# Patient Record
Sex: FEMALE | Race: BLACK OR AFRICAN AMERICAN
[De-identification: names, ages, dates, MRNs, and addresses within clinical notes are randomized per-mention and may not be internally consistent; named-entity substitution may affect disease eponyms.]

---

## 2023-03-01 ENCOUNTER — NON-APPOINTMENT (OUTPATIENT)
Age: 53
End: 2023-03-01

## 2023-03-01 ENCOUNTER — APPOINTMENT (OUTPATIENT)
Dept: ORTHOPEDIC SURGERY | Facility: CLINIC | Age: 53
End: 2023-03-01
Payer: OTHER MISCELLANEOUS

## 2023-03-01 VITALS — BODY MASS INDEX: 32.02 KG/M2 | HEIGHT: 62 IN | WEIGHT: 174 LBS

## 2023-03-01 PROBLEM — Z00.00 ENCOUNTER FOR PREVENTIVE HEALTH EXAMINATION: Status: ACTIVE | Noted: 2023-03-01

## 2023-03-01 PROCEDURE — 73110 X-RAY EXAM OF WRIST: CPT | Mod: RT

## 2023-03-01 PROCEDURE — 99072 ADDL SUPL MATRL&STAF TM PHE: CPT

## 2023-03-01 PROCEDURE — 99204 OFFICE O/P NEW MOD 45 MIN: CPT | Mod: ACP

## 2023-03-01 PROCEDURE — L3908: CPT

## 2023-03-01 RX ORDER — MELOXICAM 15 MG/1
15 TABLET ORAL
Qty: 30 | Refills: 2 | Status: ACTIVE | COMMUNITY
Start: 2023-03-01 | End: 1900-01-01

## 2023-03-01 NOTE — PHYSICAL EXAM
[Right] : right hand [Dorsal Wrist] : dorsal wrist [Distal Radius] : distal radius [First Dorsal Compartment] : first dorsal compartment [4___] : grasp 4[unfilled]/5 [] : negative tinel [TWNoteComboBox7] : dorsiflexion 40 degrees [TWNoteComboBox4] : volarflexion 30 degrees

## 2023-03-01 NOTE — HISTORY OF PRESENT ILLNESS
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 She states she was changing a pt who rolled back on to her causing a flexion injury to the right wrist. she notes limited range of motion and radially based pain since. she has had difficulties with adls and performing her work responsibilities\par \par pmh htn [FreeTextEntry5] : pt c.o pain in rt wrist \par pt injured rt wrist on monday states she was washing someone and they fell on her hand

## 2023-03-01 NOTE — ASSESSMENT
[FreeTextEntry1] : will start her on mobic, get her in cock up wrist splint, and into therapy\par she will be out of work and f/u in 2 weeks with Dr Gentile hand specialist

## 2023-03-01 NOTE — WORK
[Sprain/Strain] : sprain/strain [Was the competent medical cause of the injury] : was the competent medical cause of the injury [Are consistent with the injury] : are consistent with the injury [Consistent with my objective findings] : consistent with my objective findings [Total] : total [Does not reveal pre-existing condition(s) that may affect treatment/prognosis] : does not reveal pre-existing condition(s) that may affect treatment/prognosis [Cannot return to work because ________] : cannot return to work because [unfilled] [Climbing stairs/Ladders] : climbing stairs/ladders [Lifting] : lifting [Pulling/Pushing] : pulling/pushing [Simple grasping] : simple grasping [Reaching overhead] : reaching overhead [Reaching at/below shoulder level] : reaching at or below shoulder level [Use of upper extremities] : use of upper extremities [15+ days] : 15+ days [Patient] : patient [No] : No [No Rx restrictions] : No Rx restrictions. [I provided the services listed above] :  I provided the services listed above. [Less than Sedentary Work:] :  Less than Sedentary Work: Unable to meet the requirement of Sedentary Work. [FreeTextEntry1] : good

## 2023-03-06 ENCOUNTER — NON-APPOINTMENT (OUTPATIENT)
Age: 53
End: 2023-03-06

## 2023-03-14 ENCOUNTER — APPOINTMENT (OUTPATIENT)
Dept: ORTHOPEDIC SURGERY | Facility: CLINIC | Age: 53
End: 2023-03-14
Payer: OTHER MISCELLANEOUS

## 2023-03-14 DIAGNOSIS — I10 ESSENTIAL (PRIMARY) HYPERTENSION: ICD-10-CM

## 2023-03-14 PROCEDURE — 20550 NJX 1 TENDON SHEATH/LIGAMENT: CPT | Mod: RT

## 2023-03-14 PROCEDURE — 99214 OFFICE O/P EST MOD 30 MIN: CPT | Mod: 25

## 2023-03-14 RX ORDER — SPIRONOLACTONE 50 MG/1
TABLET ORAL
Refills: 0 | Status: ACTIVE | COMMUNITY

## 2023-03-14 NOTE — HISTORY OF PRESENT ILLNESS
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 She was putting a diaper on a patient with the patient laying on her side. The patient rolled back onto her hand, extending her wrist. \par Saw POPPY Melendez on 3/1/23 => XR, provided wrist brace, Rx Mobic.\par Patient reports that pain is improving.\par \par Denies prior injury/issue to RIGHT hand/wrist.\par She was initially OOW, returned to work - light duty - on 3/7/23.\par \par Hx: HTN. [] : no [FreeTextEntry1] : right wrist  [FreeTextEntry3] : 02/20/23 [FreeTextEntry5] : CEDRIC MCKAY was seen in [facility] on 03/01/23, she was fitted for a wrist brace and is on light duty. she states her pain decreased since that visit. pain with gripping, pushing and pulling. [de-identified] : gripping, pushing, pulling  [de-identified] : 03/01/23 [de-identified] : POPPY Melendez [de-identified] : xrays  [de-identified] : CNA

## 2023-03-14 NOTE — IMAGING
[de-identified] : RIGHT HAND\par thenar atrophy.\par skin intact. no swelling.\par TTP to 1st extensor compartment > thumb CMCJ. mild TTP to RF A1 pulley.\par good wrist extension, flexion. good pronation, supination. \par good EPL, FPL. good finger extension, flex to full fist. good finger abduction and adduction. \par SILT to median, ulnar, radial distribution. \par palpable radial pulse, brisk cap refill all digits.\par no triggering.\par negative Tinel's at carpal tunnel.\par + Finkelstein's test.\par \par + radial wrist pain with wrist extension.\par Steward's maneuver => no pain. no instability.\par DRUJ shear => no pain. no instability.\par \par \par XRAYS OF RIGHT WRIST 3/1/23: no acute displaced fracture or dislocation.

## 2023-03-14 NOTE — ASSESSMENT
[FreeTextEntry1] : The condition was explained to the patient.\par \par Discussed risks and benefits of treatment options for tenosynovitis - activity modification, NSAID, splint, steroid injection, or surgery.\par Patient would like to proceed with CSI for DeQuervain's tenosynovitis.\par - Discussed risks, benefits, and alternatives as well as contents of injection. Risks include, but are not limited allergic reaction, flare reaction, injection site pain, bruising, numbness, increased blood sugar, skin discoloration, fat atrophy, tendon rupture, and infection. Risk of immune suppression and increased susceptibility to infection with steroid use. We discussed that too many injections may lead to weakening of the tendon and tendon rupture. Patient expressed understanding and would like to proceed with injection.\par - The skin over the RIGHT wrist 1st extensor compartment just distal to the radial styloid was cleansed with alcohol and anesthetized with ethyl chloride. The 1st extensor compartment was injected with 3mg of celestone, 0.5cc of 1% lidocaine. Site was dressed with gauze and an ACE wrap. Patient tolerated the procedure well.\par - discussed that it may take up to 1 week for symptoms to improve after CSI.\par \par - wear wrist brace PRN.\par - Work: patient feels comfortable returning to work without restrictions tomorrow 3/15/23.\par \par F/u 2wks.\par

## 2023-03-14 NOTE — WORK
[Sprain/Strain] : sprain/strain [Other: ___] : [unfilled] [Was the competent medical cause of the injury] : was the competent medical cause of the injury [Are consistent with the injury] : are consistent with the injury [Consistent with my objective findings] : consistent with my objective findings [Partial] : partial [Can return to work without limitations on ______] : can return to work without limitations on [unfilled] [No Rx restrictions] : No Rx restrictions. [I provided the services listed above] :  I provided the services listed above. [FreeTextEntry1] : fair

## 2023-03-28 ENCOUNTER — APPOINTMENT (OUTPATIENT)
Dept: ORTHOPEDIC SURGERY | Facility: CLINIC | Age: 53
End: 2023-03-28
Payer: OTHER MISCELLANEOUS

## 2023-03-28 DIAGNOSIS — S63.501A UNSPECIFIED SPRAIN OF RIGHT WRIST, INITIAL ENCOUNTER: ICD-10-CM

## 2023-03-28 PROCEDURE — 99214 OFFICE O/P EST MOD 30 MIN: CPT

## 2023-03-28 RX ORDER — DICLOFENAC SODIUM 1% 10 MG/G
1 GEL TOPICAL
Qty: 1 | Refills: 2 | Status: ACTIVE | COMMUNITY
Start: 2023-03-28 | End: 1900-01-01

## 2023-03-28 NOTE — ASSESSMENT
[FreeTextEntry1] : - provided handout of HEP for DeQuervain's tenosynovitis.\par - activity as tolerated.\par - prescribed Diclofenac gel PRN.\par - Work: she is currently working without restrictions.\par \par F/u PRN.

## 2023-03-28 NOTE — WORK
[Sprain/Strain] : sprain/strain [Other: ___] : [unfilled] [Was the competent medical cause of the injury] : was the competent medical cause of the injury [Are consistent with the injury] : are consistent with the injury [Consistent with my objective findings] : consistent with my objective findings [No Rx restrictions] : No Rx restrictions. [I provided the services listed above] :  I provided the services listed above.

## 2023-03-28 NOTE — HISTORY OF PRESENT ILLNESS
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 s/p CSI 3/14/23. reports 90% improvement in pain after injection.\par - f/u RIGHT wrist sprain.\par She is currently working without restrictions.\par \par 3/14/23: 52yo RHD female presents for RIGHT radial wrist pain after an injury at work on 2/20/23. She was putting a diaper on a patient with the patient laying on her side. The patient rolled back onto her hand, extending her wrist. \par Saw POPPY Melendez on 3/1/23 => XR, provided wrist brace, Rx Mobic.\par Patient reports that pain is improving.\par \par Denies prior injury/issue to RIGHT hand/wrist.\par She was initially OOW, returned to work - light duty - on 3/7/23.\par \par Hx: HTN. [FreeTextEntry5] :  53 year old female here for follow up Rt wrist, reports 90% improvement since last CSI\par

## 2023-04-04 ENCOUNTER — APPOINTMENT (OUTPATIENT)
Dept: ORTHOPEDIC SURGERY | Facility: CLINIC | Age: 53
End: 2023-04-04

## 2023-04-25 ENCOUNTER — APPOINTMENT (OUTPATIENT)
Dept: ORTHOPEDIC SURGERY | Facility: CLINIC | Age: 53
End: 2023-04-25
Payer: COMMERCIAL

## 2023-04-25 PROCEDURE — 99214 OFFICE O/P EST MOD 30 MIN: CPT | Mod: 25

## 2023-04-25 PROCEDURE — 73110 X-RAY EXAM OF WRIST: CPT | Mod: LT

## 2023-04-25 PROCEDURE — 20550 NJX 1 TENDON SHEATH/LIGAMENT: CPT | Mod: LT,59

## 2023-04-25 NOTE — ASSESSMENT
[FreeTextEntry1] : The condition was explained to the patient.\par \par Discussed risks and benefits of treatment options for tenosynovitis - activity modification, NSAID, splint, steroid injection, or surgery.\par \par Patient would like to proceed with CSI for trigger finger and DeQuervain's tenosynovitis.\par - Discussed risks, benefits, and alternatives as well as contents of injection. Risks include, but are not limited allergic reaction, flare reaction, injection site pain, bruising, numbness, increased blood sugar, skin discoloration, fat atrophy, tendon rupture, and infection. Risk of immune suppression and increased susceptibility to infection with steroid use. We discussed that too many injections may lead to weakening of the tendon and tendon rupture. Patient expressed understanding and would like to proceed with injection.\par - The skin over the LEFT middle finger A1 pulley was cleansed with alcohol and anesthetized with ethyl chloride. The flexor sheath was injected with 3mg of celestone, 0.5cc of 1% lidocaine. Site was dressed with a band-aid. Patient tolerated the procedure well.\par - The skin over the LEFT wrist 1st extensor compartment just distal to the radial styloid was cleansed with alcohol and anesthetized with ethyl chloride. The 1st extensor compartment was injected with 3mg of celestone, 0.5cc of 1% lidocaine. Site was dressed with gauze and an ACE wrap. Patient tolerated the procedure well.\par - discussed that it may take up to 1 week for symptoms to improve after CSI.\par \par F/u PRN.

## 2023-04-25 NOTE — IMAGING
[de-identified] : LEFT HAND\par skin intact. mild swelling over MF A1 pulley.\par TTP to 1st extensor compartment, MF A1 pulley > dorsal wrist.\par good wrist extension, flexion.\par good EPL, FPL. good finger extension, IF/MF flex to thenar eminence, other fingers flex to full fist. good finger abduction and adduction. \par SILT to median, ulnar, radial distributions.\par palpable radial pulse, brisk cap refill all digits.\par no triggering.\par negative Tinel's at carpal tunnel.\par + Finkelstein's test.\par \par \par XRAYS OF LEFT WRIST: no acute displaced fracture or dislocation.

## 2023-04-25 NOTE — HISTORY OF PRESENT ILLNESS
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 Pain is worst to radial wrist. Pain is intermittent, occurs with use of hand. Does not wake her from sleep. Denies numbness/tingling.\par Denies injury.\par Reports  that her PCP diagnosed her with CTS. Wearing wrist brace intermittently for sleep.\par Using Tylenol PRN.\par \par \par Last seen 3/28/23 for WC injury (DOI 2/20/23).\par - RIGHT DeQuervain's tenosynovitis. s/p CSI 3/14/23. reports 90% improvement in pain after injection.\par - RIGHT wrist sprain.\par \par Hx: HTN. HLD. [FreeTextEntry1] : left hand  [] : no [FreeTextEntry5] : CEDRIC crocker [RHD] 53 year old female is here today complaining of left hand pain. onset a few years ago. pt states she saw an her PCP and was dx'd with carpal tunnel syndrome. no hx of CSI's. tried diclofenac gel, no relief.  +Tylenol PRN \par pt is currently under care for R hand, feels pain in L hand is from compensation.  [de-identified] : movement  [de-identified] : outside provider

## 2023-07-18 ENCOUNTER — APPOINTMENT (OUTPATIENT)
Dept: ORTHOPEDIC SURGERY | Facility: CLINIC | Age: 53
End: 2023-07-18

## 2023-07-25 ENCOUNTER — APPOINTMENT (OUTPATIENT)
Dept: ORTHOPEDIC SURGERY | Facility: CLINIC | Age: 53
End: 2023-07-25
Payer: COMMERCIAL

## 2023-07-25 DIAGNOSIS — M65.332 TRIGGER FINGER, LEFT MIDDLE FINGER: ICD-10-CM

## 2023-07-25 DIAGNOSIS — M65.4 RADIAL STYLOID TENOSYNOVITIS [DE QUERVAIN]: ICD-10-CM

## 2023-07-25 PROCEDURE — 99214 OFFICE O/P EST MOD 30 MIN: CPT | Mod: 25

## 2023-07-25 PROCEDURE — 20550 NJX 1 TENDON SHEATH/LIGAMENT: CPT | Mod: 59,LT

## 2023-07-25 NOTE — IMAGING
[de-identified] : LEFT HAND\par skin intact. mild swelling over MF A1 pulley.\par TTP to 1st extensor compartment, MF A1 pulley.\par good wrist extension, flexion.\par good EPL, FPL. good finger extension, IF/MF flex to thenar eminence, other fingers flex to full fist. good finger abduction and adduction. \par SILT to median, ulnar, radial distributions.\par palpable radial pulse, brisk cap refill all digits.\par no triggering.\par + Finkelstein's test.

## 2023-07-25 NOTE — HISTORY OF PRESENT ILLNESS
[de-identified] : 7/25/23:\par - f/u LEFT DeQuervain's tenosynovitis. s/p CSI on 4/25/23. reports injection helped for 3 weeks, but pain has returned.\par - f/u LEFT middle trigger finger. s/p CSI on 4/25/23. 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 Pain is worst to radial wrist. Pain is intermittent, occurs with use of hand. Does not wake her from sleep. Denies numbness/tingling.\par Denies injury.\par Reports  that her PCP diagnosed her with CTS. Wearing wrist brace intermittently for sleep.\par Using Tylenol PRN.\par \par \par Last seen 3/28/23 for WC injury (DOI 2/20/23).\par - RIGHT DeQuervain's tenosynovitis. s/p CSI 3/14/23. reports 90% improvement in pain after injection.\par - RIGHT wrist sprain.\par \par Hx: HTN. HLD. [FreeTextEntry5] : CEDRIC 53 year old F here for follow up Left hand, states having pain, does state the CSI given (04/2023) did help for appox 3 week, states having sharp and achiness by the Left Wrist

## 2023-07-25 NOTE — ASSESSMENT
[FreeTextEntry1] : LEFT middle trigger finger - recurrent s/p CSI.\par LEFT DeQuervain's tenosynovits - recurrent s/p CSI.\par Reviewed risks and benefits of surgical vs non-surgical treatment.\par She would like to continue non-operative treatment.\par \par Patient would like to repeat CSI for trigger finger and DeQuervain's tenosynovitis.\par - Discussed risks, benefits, and alternatives as well as contents of injection. Risks include, but are not limited allergic reaction, flare reaction, injection site pain, bruising, numbness, increased blood sugar, skin discoloration, fat atrophy, tendon rupture, and infection. Risk of immune suppression and increased susceptibility to infection with steroid use. We discussed that too many injections may lead to weakening of the tendon and tendon rupture. Patient expressed understanding and would like to proceed with injection.\par - The skin over the LEFT middle finger A1 pulley was cleansed with alcohol and anesthetized with ethyl chloride. The flexor sheath was injected with 3mg of celestone, 0.5cc of 1% lidocaine. Site was dressed with a band-aid. Patient tolerated the procedure well.\par - The skin over the LEFT wrist 1st extensor compartment just distal to the radial styloid was cleansed with alcohol and anesthetized with ethyl chloride. The 1st extensor compartment was injected with 3mg of celestone, 0.5cc of 1% lidocaine. Site was dressed with gauze and an ACE wrap. Patient tolerated the procedure well.\par - discussed that it may take up to 1 week for symptoms to improve after CSI.\par \par - prescribed OT for L hand.\par \par F/u PRN.

## 2025-06-12 ENCOUNTER — APPOINTMENT (OUTPATIENT)
Dept: ORTHOPEDIC SURGERY | Facility: CLINIC | Age: 55
End: 2025-06-12
Payer: COMMERCIAL

## 2025-06-12 VITALS — HEIGHT: 62 IN | WEIGHT: 174 LBS | BODY MASS INDEX: 32.02 KG/M2

## 2025-06-12 PROCEDURE — 72100 X-RAY EXAM L-S SPINE 2/3 VWS: CPT

## 2025-06-12 PROCEDURE — 99203 OFFICE O/P NEW LOW 30 MIN: CPT

## 2025-06-12 PROCEDURE — 72170 X-RAY EXAM OF PELVIS: CPT
